# Patient Record
Sex: FEMALE | Race: BLACK OR AFRICAN AMERICAN
[De-identification: names, ages, dates, MRNs, and addresses within clinical notes are randomized per-mention and may not be internally consistent; named-entity substitution may affect disease eponyms.]

---

## 2020-09-25 ENCOUNTER — HOSPITAL ENCOUNTER (EMERGENCY)
Dept: HOSPITAL 72 - EMR | Age: 70
LOS: 1 days | Discharge: ANOTHER HOSPITAL | End: 2020-09-26
Payer: MEDICARE

## 2020-09-25 VITALS — DIASTOLIC BLOOD PRESSURE: 76 MMHG | SYSTOLIC BLOOD PRESSURE: 129 MMHG

## 2020-09-25 VITALS — WEIGHT: 280 LBS | BODY MASS INDEX: 43.95 KG/M2 | HEIGHT: 67 IN

## 2020-09-25 DIAGNOSIS — I10: ICD-10-CM

## 2020-09-25 DIAGNOSIS — Z88.6: ICD-10-CM

## 2020-09-25 DIAGNOSIS — Z88.0: ICD-10-CM

## 2020-09-25 DIAGNOSIS — Z86.19: ICD-10-CM

## 2020-09-25 DIAGNOSIS — Z79.899: ICD-10-CM

## 2020-09-25 DIAGNOSIS — I50.9: Primary | ICD-10-CM

## 2020-09-25 DIAGNOSIS — E11.9: ICD-10-CM
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
## 2020-09-25 DIAGNOSIS — J18.9: ICD-10-CM

## 2020-09-25 DIAGNOSIS — J96.21: ICD-10-CM

## 2020-09-25 DIAGNOSIS — N30.00: ICD-10-CM

## 2020-09-25 DIAGNOSIS — Z88.8: ICD-10-CM

## 2020-09-25 DIAGNOSIS — I24.9: ICD-10-CM

## 2020-09-25 LAB
ADD MANUAL DIFF: NO
ALBUMIN SERPL-MCNC: 2.7 G/DL (ref 3.4–5)
ALBUMIN/GLOB SERPL: 0.7 {RATIO} (ref 1–2.7)
ALP SERPL-CCNC: 72 U/L (ref 46–116)
ALT SERPL-CCNC: 9 U/L (ref 12–78)
ANION GAP SERPL CALC-SCNC: -1 MMOL/L (ref 5–15)
APPEARANCE UR: (no result)
APTT BLD: 33 SEC (ref 23–33)
AST SERPL-CCNC: 21 U/L (ref 15–37)
BASOPHILS NFR BLD AUTO: 0.8 % (ref 0–2)
BILIRUB SERPL-MCNC: 0.4 MG/DL (ref 0.2–1)
BUN SERPL-MCNC: 12 MG/DL (ref 7–18)
CALCIUM SERPL-MCNC: 9.1 MG/DL (ref 8.5–10.1)
CHLORIDE SERPL-SCNC: 100 MMOL/L (ref 98–107)
CK MB SERPL-MCNC: 1 NG/ML (ref 0–3.6)
CK SERPL-CCNC: 21 U/L (ref 26–308)
CO2 SERPL-SCNC: 36 MMOL/L (ref 21–32)
CREAT SERPL-MCNC: 0.6 MG/DL (ref 0.55–1.3)
EOSINOPHIL NFR BLD AUTO: 1.4 % (ref 0–3)
ERYTHROCYTE [DISTWIDTH] IN BLOOD BY AUTOMATED COUNT: 15.8 % (ref 11.6–14.8)
FERRITIN SERPL-MCNC: 149 NG/ML (ref 8–388)
GLOBULIN SER-MCNC: 3.8 G/DL
GLUCOSE UR STRIP-MCNC: NEGATIVE MG/DL
HCT VFR BLD CALC: 52.1 % (ref 37–47)
HGB BLD-MCNC: 14.7 G/DL (ref 12–16)
INR PPP: 1 (ref 0.9–1.1)
KETONES UR QL STRIP: NEGATIVE
LDH SERPL L TO P-CCNC: 243 U/L (ref 81–234)
LEUKOCYTE ESTERASE UR QL STRIP: (no result)
LYMPHOCYTES NFR BLD AUTO: 22.1 % (ref 20–45)
MCV RBC AUTO: 94 FL (ref 80–99)
MONOCYTES NFR BLD AUTO: 7.9 % (ref 1–10)
NEUTROPHILS NFR BLD AUTO: 67.9 % (ref 45–75)
NITRITE UR QL STRIP: POSITIVE
PH UR STRIP: 6.5 [PH] (ref 4.5–8)
PLATELET # BLD: 189 K/UL (ref 150–450)
POTASSIUM SERPL-SCNC: 5 MMOL/L (ref 3.5–5.1)
PROT UR QL STRIP: (no result)
RBC # BLD AUTO: 5.56 M/UL (ref 4.2–5.4)
SODIUM SERPL-SCNC: 135 MMOL/L (ref 136–145)
SP GR UR STRIP: 1.01 (ref 1–1.03)
UROBILINOGEN UR-MCNC: NORMAL MG/DL (ref 0–1)
WBC # BLD AUTO: 6.9 K/UL (ref 4.8–10.8)

## 2020-09-25 PROCEDURE — 87181 SC STD AGAR DILUTION PER AGT: CPT

## 2020-09-25 PROCEDURE — 93005 ELECTROCARDIOGRAM TRACING: CPT

## 2020-09-25 PROCEDURE — 85610 PROTHROMBIN TIME: CPT

## 2020-09-25 PROCEDURE — 85379 FIBRIN DEGRADATION QUANT: CPT

## 2020-09-25 PROCEDURE — 82728 ASSAY OF FERRITIN: CPT

## 2020-09-25 PROCEDURE — 82803 BLOOD GASES ANY COMBINATION: CPT

## 2020-09-25 PROCEDURE — 83615 LACTATE (LD) (LDH) ENZYME: CPT

## 2020-09-25 PROCEDURE — 81003 URINALYSIS AUTO W/O SCOPE: CPT

## 2020-09-25 PROCEDURE — 80053 COMPREHEN METABOLIC PANEL: CPT

## 2020-09-25 PROCEDURE — 71045 X-RAY EXAM CHEST 1 VIEW: CPT

## 2020-09-25 PROCEDURE — 96375 TX/PRO/DX INJ NEW DRUG ADDON: CPT

## 2020-09-25 PROCEDURE — 82550 ASSAY OF CK (CPK): CPT

## 2020-09-25 PROCEDURE — 83880 ASSAY OF NATRIURETIC PEPTIDE: CPT

## 2020-09-25 PROCEDURE — 85730 THROMBOPLASTIN TIME PARTIAL: CPT

## 2020-09-25 PROCEDURE — 36415 COLL VENOUS BLD VENIPUNCTURE: CPT

## 2020-09-25 PROCEDURE — 99291 CRITICAL CARE FIRST HOUR: CPT

## 2020-09-25 PROCEDURE — 83690 ASSAY OF LIPASE: CPT

## 2020-09-25 PROCEDURE — 83605 ASSAY OF LACTIC ACID: CPT

## 2020-09-25 PROCEDURE — 85025 COMPLETE CBC W/AUTO DIFF WBC: CPT

## 2020-09-25 PROCEDURE — 84484 ASSAY OF TROPONIN QUANT: CPT

## 2020-09-25 PROCEDURE — 87040 BLOOD CULTURE FOR BACTERIA: CPT

## 2020-09-25 PROCEDURE — 96376 TX/PRO/DX INJ SAME DRUG ADON: CPT

## 2020-09-25 PROCEDURE — 82553 CREATINE MB FRACTION: CPT

## 2020-09-25 PROCEDURE — 86140 C-REACTIVE PROTEIN: CPT

## 2020-09-25 PROCEDURE — 87086 URINE CULTURE/COLONY COUNT: CPT

## 2020-09-25 PROCEDURE — 96367 TX/PROPH/DG ADDL SEQ IV INF: CPT

## 2020-09-25 PROCEDURE — 96365 THER/PROPH/DIAG IV INF INIT: CPT

## 2020-09-25 NOTE — NUR
ED Nurse Note:

Recieved pt NELSY from [facility] with c/o SOB, pt has hx of COPD on O2, pt 
arrived with NRB mask and o2 sat=96%, was informed sat low at 87 when arrived, 
pt is awake and alert, oriented to name and place only, pt has periods of 
confusion asking where is she, sob noted at rest, pt immediately placed on 
monitoring, IV lines placed and labs drawn also, pt c/o having opain to left 
lower ext where dressing is, pt also c/o pain in mid abdomen / upper chest, pt 
is moaning and grasping site, rates pain at 8/10, MD informed immediately, will 
resume care as ordered and continue to closely monitor.

## 2020-09-25 NOTE — EMERGENCY ROOM REPORT
History of Present Illness


General


Chief Complaint:  Dyspnea/Respdistress


Source:  Patient, Medical Record





Present Illness


HPI


This is a 70-year-old female from the nursing home.  She has a history of COVID 

infection in May.  She also has a history of COPD and on oxygen, diabetes, seiz

ure, hypertension and CVA.  He presents with chief complaint of shortness of 

breath and hypoxia.  According to EMS, oxygenation was 87 to 91%.  Patient 

complained of short of breath also.  Also complaining of leg pain.  No noted 

fever or chills.  No cough or congestion.  She is limited because of her 

condition.


Allergies:  


Coded Allergies:  


     ACE INHIBITORS (Unverified  Allergy, Unknown, 9/25/20)


     CEFTRIAXONE (Unverified  Allergy, Unknown, 9/25/20)


     EZETIMIBE (Unverified  Allergy, Unknown, 9/25/20)


     FENOFIBRATE (Unverified  Allergy, Unknown, 9/25/20)


     MELOXICAM (Unverified  Allergy, Unknown, 9/25/20)


     METOLAZONE (Unverified  Allergy, Unknown, 9/25/20)


     NSAIDS (NON-STEROIDAL ANTI-INFLAMMA (Unverified  Allergy, Unknown, 9/25/20)


     PENICILLINS (Unverified  Allergy, Unknown, 9/25/20)


     STATINS-HMG-COA REDUCTASE INHIBITOR (Unverified  Allergy, Unknown, 9/25/20)


Uncoded Allergies:  


     ENZYME INHIBITORS (Allergy, Unknown, 9/25/20)





COVID-19 Screening


Contact w/high risk pt:  Yes


Experienced COVID-19 symptoms?:  No


COVID-19 Testing performed PTA:  Yes - [facility]


COVID-19 Screening:  [facility] COVID-19


COVID-19 Testing Source:  [facility] unk time





Patient History


Past Medical History:  see triage record, old chart reviewed, DM, HTN, COPD


Past Surgical History:  other


Pertinent Family History:  none


Social History:  Denies: smoking


Pregnant Now:  No


Immunizations:  other


Reviewed Nursing Documentation:  PMH: Agreed; PSxH: Agreed





Review of Systems


Eye:  Denies: eye pain, blurred vision


ENT:  Denies: ear pain, nose congestion, throat swelling


Respiratory:  Reports: cough, shortness of breath


Cardiovascular:  Denies: chest pain, palpitations


Gastrointestinal:  Denies: abdominal pain, diarrhea, nausea, vomiting


Musculoskeletal:  Denies: back pain, joint pain


Skin:  Denies: rash


Neurological:  Denies: headache, numbness


Endocrine:  Denies: increased thirst, increased urine


Hematologic/Lymphatic:  Denies: easy bruising


All Other Systems:  negative except mentioned in HPI





Physical Exam





Vital Signs








  Date Time  Temp Pulse Resp B/P (MAP) Pulse Ox O2 Delivery O2 Flow Rate FiO2


 


9/25/20 21:59 97.9 85 18 119/69 (86) 93 Non-Rebreather  





Vitals with hypoxia


Sp02 EP Interpretation:  abnormal


General Appearance:  alert, mild distress, Chronically Ill


Head:  normocephalic, atraumatic


Eyes:  bilateral eye PERRL, bilateral eye EOMI


ENT:  hearing grossly normal, normal pharynx


Neck:  full range of motion, supple, no meningismus


Respiratory:  chest non-tender, decreased breath sounds, accessory muscle use


Cardiovascular #1:  regular rate, rhythm, no murmur


Gastrointestinal:  normal bowel sounds, non tender, no mass, no organomegaly, no

bruit, non-distended


Musculoskeletal:  back normal, normal range of motion, tender - Diffuse 

tenderness


Psychiatric:  mood/affect normal





Procedures


Critical Care Time


Critical Care Time


 critical care is mandated in this patient who presented with acute on chronic 

respiratory failure secondary to CHF and pneumonia.  Patient require my urgent 

intervention to attenuate the risks of metabolic collapse which may lead to 

cardiovascular collapse and death.  Critical care time is 35 minutes excluding 

any reportable procedure.  Critical care time included evaluation, multiple 

reevaluation, looking at old charts, interpreting laboratory and diagnostic 

data, discussing case with patient and family and consultants, and charting.





Medical Decision Making


Diagnostic Impression:  


   Primary Impression:  


   Acute exacerbation of CHF (congestive heart failure)


   Qualified Codes:  I50.9 - Heart failure, unspecified


   Additional Impressions:  


   HCAP (healthcare-associated pneumonia)


   Respiratory failure with hypoxia


   Qualified Codes:  J96.21 - Acute and chronic respiratory failure with hypoxia


   UTI (urinary tract infection)


   Qualified Codes:  N30.00 - Acute cystitis without hematuria


   ACS (acute coronary syndrome)


ER Course


This patient presents with acute on chronic respiratory failure.  This may be a 

combination of CHF and pneumonia.  I held off on giving patient fluids because 

of her CHF.  COVID is negative.  Patient received aspirin and Lovenox here 

because of intermediate troponin.  This may be troponin leak secondary to CHF.  

No evidence of ST elevation MI.





I discussed the case with Dr. Wiley, [facility].  He accepted patient for 

transfer.  Case #[id number].


EKG Diagnostic Results


Rate:  normal


Rhythm:  NSR


ST Segments:  other - NSST changes


ASA given to the pt in ED:  Yes





Rhythm Strip Diag. Results


EP Interpretation:  yes


Rate:  84


Rhythm:  NSR, no PVC's, no ectopy





Chest X-Ray Diagnostic Results


Chest X-Ray Diagnostic Results :  


   Chest X-Ray Ordered:  Yes


   # of Views/Limited/Complete:  1 View


   Indication:  Shortness of Breath


   EP Interpretation:  Yes


   Interpretation:  no pneumothorax, other - Cardiomegaly, CHF, left pleural 

effusion versus infiltrate.


   Impression:  Other - Left pleural effusion versus infiltrate


   Electronically Signed by:  Niranjan Gonzales MD





Last Vital Signs








  Date Time  Temp Pulse Resp B/P (MAP) Pulse Ox O2 Delivery O2 Flow Rate FiO2


 


9/25/20 21:59 97.9 85 18 119/69 (86) 93 Non-Rebreather  








Status:  improved


Disposition:  SHORT-TERM HOSP


Condition:  Stable











Niranjan Gonzales MD                  Sep 25, 2020 22:30

## 2020-09-25 NOTE — NUR
ED Nurse Note:



Repeat troponin level drawn and sent, pt medicated as ordered, tolerating meds 
well, no s/s of adverse reaction noted, pt continues to have intermittent 
periods of c/o mid chest pain, pt medicated as ordered, remains on cardiac 
monitoring, assisted with repositioning, pt is morbid obese, pillows used for 
support, pt ahs severe constant tremor and shaking.

## 2020-09-25 NOTE — DIAGNOSTIC IMAGING REPORT
EXAM:

  XR Chest, 1 View

 

CLINICAL HISTORY:

  SOB

 

TECHNIQUE:

  Frontal view of the chest.

 

COMPARISON:

  No previous studies.

 

FINDINGS:

  Lungs:  Prominence of central pulmonary vasculature.

  Pleural space:  Moderate left pleural effusion appeared  No 

pneumothorax.

  Heart:  There is cardiomegaly.

  Mediastinum:  Unremarkable.

  Bones/joints:  Osteopenia.

  Other findings:  The patient is slightly rotated right of midline.

 

IMPRESSION:     

1.  Cardiomegaly.

2.  Prominence of central pulmonary vascular.

3.  Moderate left pleural effusion.

4.  This constellation of findings is most compatible with congestive 

heart failure.

5.  Clinical correlation is advised.

## 2020-09-26 VITALS — DIASTOLIC BLOOD PRESSURE: 89 MMHG | SYSTOLIC BLOOD PRESSURE: 147 MMHG

## 2020-09-26 VITALS — SYSTOLIC BLOOD PRESSURE: 110 MMHG | DIASTOLIC BLOOD PRESSURE: 51 MMHG

## 2020-09-26 VITALS — SYSTOLIC BLOOD PRESSURE: 116 MMHG | DIASTOLIC BLOOD PRESSURE: 52 MMHG

## 2020-09-26 VITALS — SYSTOLIC BLOOD PRESSURE: 104 MMHG | DIASTOLIC BLOOD PRESSURE: 60 MMHG

## 2020-09-26 VITALS — DIASTOLIC BLOOD PRESSURE: 96 MMHG | SYSTOLIC BLOOD PRESSURE: 123 MMHG

## 2020-09-26 VITALS — DIASTOLIC BLOOD PRESSURE: 50 MMHG | SYSTOLIC BLOOD PRESSURE: 101 MMHG

## 2020-09-26 LAB — APTT PPP: YELLOW S

## 2020-09-26 NOTE — NUR
ED Nurse Note:



Pt continues to rest quietly in bed, on cardiac monitoring, pt has been taken 
off NRB mask and on O2 3L n/c and tolerating well with sat of 99%, pt medicated 
for pain, meds effective, v/s stable, pt repositioned and turned, concepcion to 
gravity, urine output great after lasix, pt waiting for transfer info, will 
continue to monitor while waiting for information.

## 2020-09-26 NOTE — NUR
ED Nurse Note:



Pt was picked up by [facility] on stable condition; report was given to VEGA Mejia from [place] by VEGA Mercedes. All belongings was sent with pt, family was 
informed of transfer.

## 2020-09-26 NOTE — NUR
ED Nurse Note:



Pt medicated for pain, meds effective, pt states she feels better, resting 
quietly, oxygen changed to nasal cannula, sat=96%, tolerating well, ABG done, 
concepcion to gravity, lasix given effective, pt with output noted, pt repositioned 
and turned with pillows support, pt is to be transferred to [facility], 
waiting for transfer info, will continue to closely montior, no s/s of adverse 
reaction noted from IV antibiotics given also.

## 2020-09-26 NOTE — NUR
ED Nurse Note:



Placed call to [facility] at [phone], report given to VEGA Mejia, pt 
has ambulance ETA of 0700, pt family aware of transfer, pt also, pt continues 
to rest quietly on monitoring, IV site intact and patent, v/s stable, no sob or 
labored breathing noted, will continue to closely montior while waiting for 
transport.

## 2020-09-26 NOTE — NUR
Spoke with Natasha-patients sister-informed of patient being transferred to 
[facility], gave her phone number to call as well as room number at [place].

## 2020-09-27 NOTE — CARDIOLOGY REPORT
--------------- APPROVED REPORT --------------





EKG Measurement

Heart [id number]

TN [id number]

[id number]

[id number]

[id number]



<Conclusion>

Normal sinus rhythm

Possible Left atrial enlargement

Right axis deviation

ST & T wave abnormality, consider anterior ischemia

Abnormal ECG

## 2020-09-30 NOTE — EMERGENCY ROOM REPORT
Physical Exam





Vital Signs








  Date Time  Temp Pulse Resp B/P (MAP) Pulse Ox O2 Delivery O2 Flow Rate FiO2


 


9/26/20 07:00 98.4 90 14 101/50 100 Non-Rebreather 15.0 











Medical Decision Making


Diagnostic Impression:  


   Primary Impression:  


   Acute exacerbation of CHF (congestive heart failure)


   Qualified Codes:  I50.9 - Heart failure, unspecified


   Additional Impressions:  


   Respiratory failure with hypoxia


   Qualified Codes:  J96.21 - Acute and chronic respiratory failure with hypoxia


   HCAP (healthcare-associated pneumonia)


   ACS (acute coronary syndrome)


   UTI (urinary tract infection)


   Qualified Codes:  N30.00 - Acute cystitis without hematuria


ER Course


Urine culture preliminary report showed Proteus mirabilis.  It was resistant to 

everything in the susceptibility report aside from Zosyn.  Patient was 

transferred to [place].  We spoke with him May the nurse from [place] who stated 

that the patient was on antibiotic treatment but was discharged earlier today as

she improved clinically.





Last Vital Signs








  Date Time  Temp Pulse Resp B/P (MAP) Pulse Ox O2 Delivery O2 Flow Rate FiO2


 


9/26/20 07:55 98.4 96 16 116/52 100 Non-Rebreather 15.0 








Disposition:  SHORT-TERM HOSP


Condition:  Stable


Referrals:  


NON PHYSICIAN (PCP)











Jeramy Daigle M.D.                Sep 30, 2020 04:55